# Patient Record
Sex: MALE | Race: BLACK OR AFRICAN AMERICAN | NOT HISPANIC OR LATINO
[De-identification: names, ages, dates, MRNs, and addresses within clinical notes are randomized per-mention and may not be internally consistent; named-entity substitution may affect disease eponyms.]

---

## 2020-07-02 ENCOUNTER — APPOINTMENT (OUTPATIENT)
Dept: ORTHOPEDIC SURGERY | Facility: CLINIC | Age: 30
End: 2020-07-02
Payer: OTHER MISCELLANEOUS

## 2020-07-02 VITALS
HEIGHT: 70 IN | SYSTOLIC BLOOD PRESSURE: 139 MMHG | TEMPERATURE: 96.5 F | HEART RATE: 83 BPM | WEIGHT: 290 LBS | DIASTOLIC BLOOD PRESSURE: 90 MMHG | BODY MASS INDEX: 41.52 KG/M2

## 2020-07-02 DIAGNOSIS — M70.42 PREPATELLAR BURSITIS, LEFT KNEE: ICD-10-CM

## 2020-07-02 PROBLEM — Z00.00 ENCOUNTER FOR PREVENTIVE HEALTH EXAMINATION: Status: ACTIVE | Noted: 2020-07-02

## 2020-07-02 PROCEDURE — 99203 OFFICE O/P NEW LOW 30 MIN: CPT

## 2020-07-02 PROCEDURE — 73562 X-RAY EXAM OF KNEE 3: CPT | Mod: LT

## 2020-07-09 ENCOUNTER — APPOINTMENT (OUTPATIENT)
Dept: ORTHOPEDIC SURGERY | Facility: CLINIC | Age: 30
End: 2020-07-09
Payer: OTHER MISCELLANEOUS

## 2020-07-09 ENCOUNTER — TRANSCRIPTION ENCOUNTER (OUTPATIENT)
Age: 30
End: 2020-07-09

## 2020-07-09 VITALS — TEMPERATURE: 96 F

## 2020-07-09 DIAGNOSIS — M23.92 UNSPECIFIED INTERNAL DERANGEMENT OF LEFT KNEE: ICD-10-CM

## 2020-07-09 DIAGNOSIS — M25.562 PAIN IN LEFT KNEE: ICD-10-CM

## 2020-07-09 DIAGNOSIS — M70.42 PREPATELLAR BURSITIS, LEFT KNEE: ICD-10-CM

## 2020-07-09 PROCEDURE — 99213 OFFICE O/P EST LOW 20 MIN: CPT

## 2020-07-09 NOTE — DISCUSSION/SUMMARY
[Medication Risks Reviewed] : Medication risks reviewed [de-identified] : Recommend a cane for ambulation assistance. Recommend ice/compression wrapping to help reduce swelling. Anti-inflammatory medication Naprosyn prescribed. Advised on use of the medication. Currently unable to work. Recommend followup in one week.

## 2020-07-09 NOTE — PHYSICAL EXAM
[Slightly Antalgic] : slightly antalgic [LE] : Sensory: Intact in bilateral lower extremities [DP] : dorsalis pedis 2+ and symmetric bilaterally [Knee Motion Right] : full ROM [Knee Tenderness On Palpation Left] : tenderness [Knee Swelling Left] : swelling [Normal RLE] : Right Lower Extremity: No scars, rashes, lesions, ulcers, skin intact [Normal LLE] : Left Lower Extremity: No scars, rashes, lesions, ulcers, skin intact [Normal] : Oriented to person, place, and time, insight and judgement were intact and the affect was normal [Knee Swelling Right] : no swelling [Knee Tenderness On Palpation Right] : no tenderness [Knee Medial Instability Right] : no laxity on valgus stress [Knee Motion Left] : limited ROM [Knee Lateral Instability Right] : no laxity on varus stress [Knee Medial Instability Left] : no laxity on valgus stress [de-identified] : Left knee prepatellar soft tissue swelling/warmth, small effusion. Tenderness over the patella. Patient keeps the knee flexed  20°. He can flex the knee further to nearly 90° with a heel slide.  He can actively extend the knee from a seated position and performed a left lower extremity straight leg raise seated on the exam table.   No calf tenderness. Joint is grossly stable with varus/valgus stress. Right knee: Normal appearance. No tenderness. No swelling. Pain free normal active range of motion. [Knee Lateral Instability Left] : no  laxity on varus stress [de-identified] : X-rays left knee AP weightbearing, lateral and patellar sunrise views: Thickening of the prepatellar soft tissues. No fractures

## 2020-07-09 NOTE — HISTORY OF PRESENT ILLNESS
[de-identified] : 29 year old male , presents for evaluation of Left knee pain since yesterday 7/1/20. He was at work and pivoted on his Left foot and felt a pop in the knee. He states the pain was mild at first but last night the pain was worsening and this morning the pain was severe upon waking. The knee feels stiff and swollen. He has taken Aleve with minimal relief. He has used icy hot without any relief. He denies prior knee pain or injury. \par \par He denies medical issues or smoking

## 2020-10-09 ENCOUNTER — APPOINTMENT (OUTPATIENT)
Dept: ORTHOPEDIC SURGERY | Facility: CLINIC | Age: 30
End: 2020-10-09
Payer: OTHER MISCELLANEOUS

## 2020-10-09 VITALS
HEART RATE: 94 BPM | WEIGHT: 290 LBS | BODY MASS INDEX: 41.52 KG/M2 | HEIGHT: 70 IN | SYSTOLIC BLOOD PRESSURE: 139 MMHG | TEMPERATURE: 96.1 F | DIASTOLIC BLOOD PRESSURE: 85 MMHG

## 2020-10-09 DIAGNOSIS — M54.16 RADICULOPATHY, LUMBAR REGION: ICD-10-CM

## 2020-10-09 DIAGNOSIS — M54.32 SCIATICA, LEFT SIDE: ICD-10-CM

## 2020-10-09 DIAGNOSIS — M65.9 SYNOVITIS AND TENOSYNOVITIS, UNSPECIFIED: ICD-10-CM

## 2020-10-09 PROCEDURE — 99213 OFFICE O/P EST LOW 20 MIN: CPT

## 2020-10-21 ENCOUNTER — FORM ENCOUNTER (OUTPATIENT)
Age: 30
End: 2020-10-21

## 2020-10-25 ENCOUNTER — FORM ENCOUNTER (OUTPATIENT)
Age: 30
End: 2020-10-25

## 2021-05-19 ENCOUNTER — APPOINTMENT (OUTPATIENT)
Dept: ORTHOPEDIC SURGERY | Facility: CLINIC | Age: 31
End: 2021-05-19
Payer: SELF-PAY

## 2021-05-19 VITALS
SYSTOLIC BLOOD PRESSURE: 135 MMHG | HEIGHT: 70 IN | BODY MASS INDEX: 41.52 KG/M2 | TEMPERATURE: 97.4 F | HEART RATE: 86 BPM | DIASTOLIC BLOOD PRESSURE: 86 MMHG | WEIGHT: 290 LBS

## 2021-05-19 DIAGNOSIS — M54.16 RADICULOPATHY, LUMBAR REGION: ICD-10-CM

## 2021-05-19 DIAGNOSIS — M51.26 OTHER INTERVERTEBRAL DISC DISPLACEMENT, LUMBAR REGION: ICD-10-CM

## 2021-05-19 DIAGNOSIS — Z78.9 OTHER SPECIFIED HEALTH STATUS: ICD-10-CM

## 2021-05-19 PROCEDURE — 99072 ADDL SUPL MATRL&STAF TM PHE: CPT

## 2021-05-19 PROCEDURE — 72110 X-RAY EXAM L-2 SPINE 4/>VWS: CPT

## 2021-05-19 PROCEDURE — 99214 OFFICE O/P EST MOD 30 MIN: CPT

## 2021-05-19 PROCEDURE — 72170 X-RAY EXAM OF PELVIS: CPT | Mod: 59

## 2021-05-19 RX ORDER — NAPROXEN 500 MG/1
500 TABLET ORAL
Qty: 28 | Refills: 0 | Status: DISCONTINUED | COMMUNITY
Start: 2020-07-02 | End: 2021-05-19

## 2021-05-19 RX ORDER — DICLOFENAC SODIUM 50 MG/1
50 TABLET, DELAYED RELEASE ORAL
Qty: 30 | Refills: 0 | Status: ACTIVE | COMMUNITY
Start: 2021-05-19 | End: 1900-01-01

## 2021-12-21 ENCOUNTER — EMERGENCY (EMERGENCY)
Facility: HOSPITAL | Age: 31
LOS: 0 days | Discharge: HOME | End: 2021-12-21
Payer: SELF-PAY

## 2021-12-21 VITALS
DIASTOLIC BLOOD PRESSURE: 87 MMHG | WEIGHT: 315 LBS | TEMPERATURE: 98 F | OXYGEN SATURATION: 100 % | SYSTOLIC BLOOD PRESSURE: 132 MMHG | RESPIRATION RATE: 16 BRPM | HEIGHT: 71 IN | HEART RATE: 79 BPM

## 2021-12-21 DIAGNOSIS — F17.200 NICOTINE DEPENDENCE, UNSPECIFIED, UNCOMPLICATED: ICD-10-CM

## 2021-12-21 DIAGNOSIS — M79.675 PAIN IN LEFT TOE(S): ICD-10-CM

## 2021-12-21 DIAGNOSIS — M79.672 PAIN IN LEFT FOOT: ICD-10-CM

## 2021-12-21 PROCEDURE — 99284 EMERGENCY DEPT VISIT MOD MDM: CPT

## 2021-12-21 RX ORDER — ACETAMINOPHEN 500 MG
975 TABLET ORAL ONCE
Refills: 0 | Status: COMPLETED | OUTPATIENT
Start: 2021-12-21 | End: 2021-12-21

## 2021-12-21 RX ADMIN — Medication 975 MILLIGRAM(S): at 10:40

## 2021-12-21 NOTE — ED ADULT NURSE NOTE - SUICIDE SCREENING QUESTION 3
No 1st Trimester Sonogram/20 Week Level II Sonogram/Non Invasive Prenatal Screen (NIPS)/Ultra Screen at 12 Weeks

## 2021-12-21 NOTE — ED PROVIDER NOTE - OBJECTIVE STATEMENT
31y Male with no sig PMHx presents to the ER for foot pain/injury. Patient reports atraumatic left foot pain x 2-3 days. Reports some improvement with Motrin, last dose at 6ma. Denies fever, chills, redness, fall, injury, numbness, tingling or weakness.

## 2021-12-21 NOTE — ED PROVIDER NOTE - VASCULAR COMPROMISE
no vascular compromise/pulses full and equal bilaterally Graft Cartilage Fenestration Text: The cartilage was fenestrated with a 2mm punch biopsy to help facilitate graft survival and healing.

## 2021-12-21 NOTE — ED PROVIDER NOTE - CLINICAL SUMMARY MEDICAL DECISION MAKING FREE TEXT BOX
31y Male with no sig PMHx presents to the ER for foot pain/injury. Patient reports atraumatic left foot pain x 2-3 days. Reports some improvement with Motrin, last dose at 6ma. Denies fever, chills, redness, fall, injury, numbness, tingling or weakness. Tenderness to left great toe, full ROM, neurovascularly intact. No redness, minimal swelling. Likely inflammation/gout vs MSK pain, low suspicion for fracture or dislocation. Patient declines xray as he does not have insurance right now. Will give meds, dc with surgical shoe and ace wrap.

## 2021-12-21 NOTE — ED PROVIDER NOTE - NSFOLLOWUPINSTRUCTIONS_ED_ALL_ED_FT
Today you were seen in the ER for foot/toe pain.     Take Motrin 600 mg every 8 hours as needed for moderate pain-- take with food..    Take Tylenol 650mg (Two 325 mg pills) every 4-6 hours as needed for pain.    Rest, Ice, Elevate injured area    Wear surgical shoe and ace wrap as discussed.     Follow-up with Orthopedics if symptoms persist, See referred doctor. Call today / next business day for close, prompt follow-up.    Return to Emergency room for any worsening or persistent pain, weakness, numbness, fever, color change to extremity, or any other concerning symptoms.    Advance activity as tolerated.     Continue all previously prescribed medications as directed unless otherwise instructed.     Follow up with your primary care physician in 48-72 hours- bring copies of your results.

## 2021-12-21 NOTE — ED ADULT NURSE NOTE - OBJECTIVE STATEMENT
31 year old male came in c/o of left foot pain (8/10), swelling is present xfriday 12/17/21, A&Ox4, No PMH, took motrin at home for the pain. pt states NKDA, No injury, No trauma to the left leg. No SOB, No chest pain, Not on any other medications at home. No feverish symptoms present, No rash or redness present

## 2021-12-21 NOTE — ED PROVIDER NOTE - PATIENT PORTAL LINK FT
You can access the FollowMyHealth Patient Portal offered by [facility] by registering at the following website: [email or web address]. By joining [facility]’s FollowMyHealth portal, you will also be able to view your health information using other applications (apps) compatible with our system.

## 2021-12-21 NOTE — ED ADULT NURSE NOTE - NSIMPLEMENTINTERV_GEN_ALL_ED
Implemented All Fall Risk Interventions:  [place] to call system. Call bell, personal items and telephone within reach. Instruct patient to call for assistance. Room bathroom lighting operational. Non-slip footwear when patient is off stretcher. Physically safe environment: no spills, clutter or unnecessary equipment. Stretcher in lowest position, wheels locked, appropriate side rails in place. Provide visual cue, wrist band, yellow gown, etc. Monitor gait and stability. Monitor for mental status changes and reorient to person, place, and time. Review medications for side effects contributing to fall risk. Reinforce activity limits and safety measures with patient and family.

## 2021-12-21 NOTE — ED PROVIDER NOTE - NS ED ROS FT
Constitutional: (-) Fever, (-) Chills  Skin: (-) Color changes, (-) Rashes, (-) Wounds  CV: (-) Chest pain, (-) Palpitations  Resp: (-) Cough, (-) Shortness of breath  GI: (-) Abdominal pain, (-) Nausea, (-) Vomiting   MSK: (+) Myalgias  Neuro: (-) Headache

## 2021-12-21 NOTE — ED PROVIDER NOTE - NSFOLLOWUPCLINICS_GEN_ALL_ED_FT
[facility] Orthopedic Surgery  Orthopedic Surgery  [address], 3rd & 4th [place] [address]  Phone: [phone]  Fax:     [facility]  Orthopedic Surgery  [address] [address]  Phone: [phone]  Fax:

## 2022-07-28 ENCOUNTER — APPOINTMENT (OUTPATIENT)
Dept: ORTHOPEDIC SURGERY | Facility: CLINIC | Age: 32
End: 2022-07-28

## 2022-07-28 VITALS — BODY MASS INDEX: 41.52 KG/M2 | HEIGHT: 70 IN | WEIGHT: 290 LBS

## 2022-07-28 DIAGNOSIS — M71.349 OTHER BURSAL CYST, UNSPECIFIED HAND: ICD-10-CM

## 2022-07-28 PROCEDURE — 99204 OFFICE O/P NEW MOD 45 MIN: CPT

## 2022-07-28 PROCEDURE — 73130 X-RAY EXAM OF HAND: CPT | Mod: RT

## 2022-07-28 NOTE — HISTORY OF PRESENT ILLNESS
[7] : 7 [Dull/Aching] : dull/aching [Localized] : localized [Sharp] : sharp [Intermittent] : intermittent [Full time] : Work status: full time [de-identified] : 7/28/22: 32 yo RHD male with right hand pain since Nov 2021. He states he was working with tools and was not wearing gloves. He noticed a bump on the palm of his hand. He tried home remedies like soaks and scraping. He reports sensitivity to the touch. Denies numbness/tingling. No prior injuries.  [] : Post Surgical Visit: no [FreeTextEntry1] : Rt hand [FreeTextEntry5] : patient has a cyst/bump on his hand since November 2021\par complains of pain to the touch  [de-identified] : touch

## 2022-07-28 NOTE — ASSESSMENT
[FreeTextEntry1] : Recommend MRI to eval cyst/ possible foreign body for possible exision.\par Warm soaks.\par NSAIDs prn.\par Follow up after MRI.

## 2022-07-28 NOTE — PHYSICAL EXAM
[Hand] : hand [Palm] : palm [Tender] : tender [Finger Flexion] : finger flexion [Right] : right hand [There are no fractures, subluxations or dislocations. No significant abnormalities are seen] : There are no fractures, subluxations or dislocations. No significant abnormalities are seen [] : no swelling [de-identified] : tender palpable mass on palmar side, 2nd MCP. No signs of infection

## 2022-08-03 ENCOUNTER — FORM ENCOUNTER (OUTPATIENT)
Age: 32
End: 2022-08-03

## 2022-08-04 ENCOUNTER — APPOINTMENT (OUTPATIENT)
Dept: MRI IMAGING | Facility: CLINIC | Age: 32
End: 2022-08-04

## 2022-08-04 PROCEDURE — 73218 MRI UPPER EXTREMITY W/O DYE: CPT | Mod: RT

## 2022-08-09 ENCOUNTER — APPOINTMENT (OUTPATIENT)
Dept: ORTHOPEDIC SURGERY | Facility: CLINIC | Age: 32
End: 2022-08-09

## 2022-08-09 VITALS — BODY MASS INDEX: 41.52 KG/M2 | WEIGHT: 290 LBS | HEIGHT: 70 IN

## 2022-08-09 PROCEDURE — 99214 OFFICE O/P EST MOD 30 MIN: CPT

## 2022-08-09 NOTE — HISTORY OF PRESENT ILLNESS
[Gradual] : gradual [6] : 6 [Dull/Aching] : dull/aching [Localized] : localized [Constant] : constant [Full time] : Work status: full time [de-identified] : 8/9/22:  mass on the rigt hand volar index MPj x 9 months, growing and it hurts [] : no

## 2022-08-09 NOTE — IMAGING
[de-identified] : right hand:\par Verrucous lesion that is 4 mm over pad of MP\par No TTP\par FAROM\par NVID

## 2022-08-30 ENCOUNTER — NON-APPOINTMENT (OUTPATIENT)
Age: 32
End: 2022-08-30

## 2022-09-01 ENCOUNTER — RESULT REVIEW (OUTPATIENT)
Age: 32
End: 2022-09-01

## 2022-09-02 ENCOUNTER — APPOINTMENT (OUTPATIENT)
Age: 32
End: 2022-09-02

## 2022-09-02 PROCEDURE — 26116 EXC HAND TUM DEEP < 1.5 CM: CPT | Mod: RT

## 2022-09-02 RX ORDER — ACETAMINOPHEN AND CODEINE 300; 30 MG/1; MG/1
300-30 TABLET ORAL
Qty: 12 | Refills: 0 | Status: ACTIVE | COMMUNITY
Start: 2022-09-02 | End: 1900-01-01

## 2022-09-13 ENCOUNTER — APPOINTMENT (OUTPATIENT)
Dept: ORTHOPEDIC SURGERY | Facility: CLINIC | Age: 32
End: 2022-09-13

## 2022-09-13 VITALS — BODY MASS INDEX: 41.52 KG/M2 | HEIGHT: 70 IN | WEIGHT: 290 LBS

## 2022-09-13 DIAGNOSIS — R22.31 LOCALIZED SWELLING, MASS AND LUMP, RIGHT UPPER LIMB: ICD-10-CM

## 2022-09-13 PROCEDURE — 99024 POSTOP FOLLOW-UP VISIT: CPT

## 2022-09-13 NOTE — PHYSICAL EXAM
[de-identified] : wound clean dry and intact\par no erythema\par no drainage\par FAROM\par NV unchanged\par sutures removed\par

## 2022-09-13 NOTE — HISTORY OF PRESENT ILLNESS
[Dull/Aching] : dull/aching [Localized] : localized [Sharp] : sharp [Nothing helps with pain getting better] : Nothing helps with pain getting better [de-identified] : DOS:9/2/22-  RIGHT HAND MASS EXCISION\par \par 9/13/22:  Pt reports some pain after surgery\par \par 8/9/22:  mass on the rigt hand volar index MPj x 9 red\par  [Gradual] : gradual [6] : 6 [Constant] : constant [] : Patient is currently injured and not playing sports: no [Full time] : Work status: full time [de-identified] : 9/2/22

## 2022-09-13 NOTE — ASSESSMENT
[FreeTextEntry1] : The patient was advised of the diagnosis. The natural history of the pathology was explained in full to the patient in layman's terms. All questions were answered. The risks and benefits of surgical and non-surgical treatment alternatives were explained in full to the patient.\par \par Wound care discussed.

## 2022-09-13 NOTE — IMAGING
[de-identified] : right hand:\par Verrucous lesion that is 4 mm over pad of MP\par No TTP\par FAROM\par NVID

## 2022-09-13 NOTE — REASON FOR VISIT
[FreeTextEntry2] : 09/13/2022 :JAMEE ÓSCAR , a 32 year old male, presents today for status post surgery 9/2/22 right left index finger\par
